# Patient Record
Sex: FEMALE | Race: WHITE | NOT HISPANIC OR LATINO | Employment: STUDENT | URBAN - METROPOLITAN AREA
[De-identification: names, ages, dates, MRNs, and addresses within clinical notes are randomized per-mention and may not be internally consistent; named-entity substitution may affect disease eponyms.]

---
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
## 2023-07-22 ENCOUNTER — HEALTH MAINTENANCE LETTER (OUTPATIENT)
Age: 18
End: 2023-07-22

## 2024-09-14 ENCOUNTER — HEALTH MAINTENANCE LETTER (OUTPATIENT)
Age: 19
End: 2024-09-14

## 2025-04-22 NOTE — PATIENT INSTRUCTIONS
1. Your blood test is fine. The xray showed normal lungs but you have scoliosis.    2. Make an xray appointment in 4-6 months to recheck the scoliosis - I will then contact you with results along with further instructions.   ABDOMINAL PAIN